# Patient Record
Sex: FEMALE | Race: WHITE
[De-identification: names, ages, dates, MRNs, and addresses within clinical notes are randomized per-mention and may not be internally consistent; named-entity substitution may affect disease eponyms.]

---

## 2017-01-07 NOTE — UC
Cardiac HPI





- HPI Summary


HPI Summary: 


C/o a burning, aching sensation in the upper chest for 6 days.  The pain is 

worse when she lays down. Here with Mom Violeta. She states that she gets this a 

lot. 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 over left chest 

wall. this is different bc it is not tender to palpation. usually it is 

stabbing. now it is burning aching. constant. not worse. not SOB. no 

tachycrardia or palpitations that she knows of. not sick, not coughing. GM had 

blood clot s/p heart surgery - uncertain genetic testing. denies specific 

recent swelling in legs although she has constant swelling and pain in her 

legs. 


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 

listed in med hx. states it is resolved - last checked a few months ago. 


Pain max 2/10, but 3-4/10 when she lies down. she just woke up with it on 

Monday. 


non-smoker. she is on OCPs. 


She is here with her Mom. 


denies pregnancy








- History of Current Complaint


Chief Complaint: UCChestPain


Stated Complaint: CHEST DISCOMFORT


Time Seen by Provider: 01/07/17 11:42





- Allergy/Home Medications


Allergies/Adverse Reactions: 


 Allergies











Allergy/AdvReac Type Severity Reaction Status Date / Time


 


Ciprofloxacin [From Cipro] Allergy Unknown Unknown Verified 01/07/17 11:41





   Reaction  





   Details  


 


Penicillins Allergy Unknown Unknown Verified 01/07/17 11:41





   Reaction  





   Details  


 


Clindamycin Allergy  Hives Verified 01/07/17 11:41


 


Sulfamethoxazole Allergy  Hives Verified 01/07/17 11:41





w/Trimethoprim     





[From Bactrim]     














PMH/Surg Hx/FS Hx/Imm Hx


Previously Healthy: Yes





- Surgical History


Surgical History: Yes


Surgery Procedure, Year, and Place: ovarian- pre-cancerous cells.  stent placed 

in kidneys





- Family History


Known Family History: Positive: Cardiac Disease, Hypertension, Blood Disorder - 

GM with blood clot post-op





- Social History


Alcohol Use: Rare


Substance Use Type: None


Smoking Status (MU): Former Smoker


Type: Cigarettes


Amount Used/How Often: 1/2 pack daily


When Did the Patient Quit Smoking/Using Tobacco: 2014





- Immunization History


Most Recent Influenza Vaccination: not this season





Review of Systems


Constitutional: Negative


Skin: Negative


Eyes: Negative


ENT: Negative


Respiratory: Negative


Cardiovascular: Chest Pain


Gastrointestinal: Negative


Genitourinary: Negative


Motor: Negative


Neurovascular: Negative


Musculoskeletal: Negative


Neurological: Negative


Psychological: Negative


All Other Systems Reviewed And Are Negative: Yes





Physical Exam


Triage Information Reviewed: Yes


Appearance: Well-Appearing, No Pain Distress, Well-Nourished - vague historian 

with brief answers


Vital Signs: 


 Initial Vital Signs











Temp  98.0 F   01/07/17 11:38


 


Pulse  81   01/07/17 11:38


 


Resp  16   01/07/17 11:38


 


BP  108/74   01/07/17 11:38


 


Pulse Ox  99   01/07/17 11:38











Eye Exam: Normal


ENT Exam: Normal


ENT: Positive: Normal ENT inspection, Pharynx normal, TMs normal.  Negative: 

Nasal congestion


Dental Exam: Normal


Neck exam: Normal


Neck: Positive: Supple, Nontender, No Lymphadenopathy


Respiratory: Positive: Chest non-tender - but she does admit to some "mild 

pressure" when I push over her left upper chest wall., Lungs clear, Normal 

breath sounds, No respiratory distress, No accessory muscle use.  Negative: 

Crackles, Rhonchi, Stridor, Wheezing


Cardiovascular Exam: Normal


Cardiovascular: Positive: RRR, No Murmur, Pulses Normal, Brisk Capillary Refill


Abdominal Exam: Normal


Abdomen Description: Positive: Nontender, Soft


Musculoskeletal Exam: Normal


Neurological Exam: Normal


Psychological Exam: Normal


Skin Exam: Normal





Diagnostics





- EKG


Cardiac Rate: NL - nml axis, no AV/IV changes, no ST/T changes, no S1, Q 3, 

inverted T3 pattern. no previous to compare to


Cardiac Rhythm: Sinus: Normal


Ectopy: None


ST Segment: Normal





Re-Evaluation





- Re-Evaluation


  ** First Eval


Re-Evaluation Time: 12:30


Change: Unchanged





- Assessment/Plan


Course Of Treatment: EKG - nml. I recommend ER eval for CT with contrast to r/o 

DVT (RFs include + fhx blood clot and on ocp). They decline ER/CT AMA. They are 

aware of risk of blood clot/PE and dangers/risks of death and permanent 

disability. They continue to refuse.  I have therefore ordered a CXR at this 

time that they agree to.  CXR is nefative for acute disease.  Mom has left by 

now. Bryson has not changed her mind about going to the ER. I have advised her 

to go to ER with any worsening sx, SOB, diaphoresis, etc.





- Differential Diagnoses - Chest Pain


Differential Diagnosis/HQI/PQRI: Chest Wall, Pulmonary Embolism





- Clinical Impression


Provider Diagnoses: chest pain





Discharge





- Discharge Plan


Condition: Good


Disposition: AGAINST MEDICAL ADVICE


Patient Education Materials:  Thoracic Pain (ED)


Referrals: 


Tegan Phillips MD [Primary Care Provider] - 2 Days


Additional Instructions: 


Make sure to follow up with your PCP in 2 days. Go to the ER with any worsening 

pain or other symptoms

## 2017-01-07 NOTE — RAD
HISTORY: Left chest pain



COMPARISONS: February 26, 2014



VIEWS: 2: Frontal dual-energy and lateral views of the chest.



FINDINGS:

CARDIOMEDIASTINAL SILHOUETTE: The cardiomediastinal silhouette is normal.

BROWN: The brown are normal.

PLEURA: The costophrenic angles are sharp. No pleural abnormalities are noted.

LUNG PARENCHYMA: The lungs are clear.

ABDOMEN: The upper abdomen is clear. There is no subphrenic gas.

BONES AND SOFT TISSUES: No bone or soft tissue abnormalities are noted.

OTHER: None.



IMPRESSION:

NO ACTIVE CARDIOPULMONARY DISEASE.

## 2018-12-26 ENCOUNTER — HOSPITAL ENCOUNTER (EMERGENCY)
Dept: HOSPITAL 25 - UCCORT | Age: 33
Discharge: HOME | End: 2018-12-26
Payer: COMMERCIAL

## 2018-12-26 VITALS — DIASTOLIC BLOOD PRESSURE: 78 MMHG | SYSTOLIC BLOOD PRESSURE: 120 MMHG
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
## 2018-12-26 DIAGNOSIS — Z88.1: ICD-10-CM

## 2018-12-26 DIAGNOSIS — Z87.891: ICD-10-CM
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
## 2018-12-26 DIAGNOSIS — S90.31XA: Primary | ICD-10-CM

## 2018-12-26 DIAGNOSIS — Z88.0: ICD-10-CM

## 2018-12-26 PROCEDURE — 99211 OFF/OP EST MAY X REQ PHY/QHP: CPT

## 2018-12-26 PROCEDURE — G0463 HOSPITAL OUTPT CLINIC VISIT: HCPCS

## 2018-12-26 NOTE — UC
Lower Extremity/Ankle HPI





- HPI Summary


HPI Summary: 


33-year-old female presents with complaints of right foot pain.  States 

approximately 6 weeks ago she accidentally dropped a piece pain Seiter on her 

foot.  States pain was improving however to 3 days ago her great jaquan stepped 

on her foot and  reaggravated the injury.  Reports some bruising and mild 

swelling to the dorsal aspect of her right foot.  She has been able to ambulate 

and bear weight without difficulty.  Has not taken any over-the-counter 

analgesics.  Denies any numbness or tingling.





- History of Current Complaint


Chief Complaint: UCLowerExtremity


Stated Complaint: RIGHT FOOT INJURY


Time Seen by Provider: 18 16:43


Hx Obtained From: Patient


Hx Last Menstrual Period: 18


Pain Intensity: 3





- Allergies/Home Medications


Allergies/Adverse Reactions: 


 Allergies











Allergy/AdvReac Type Severity Reaction Status Date / Time


 


ciprofloxacin [From Cipro] Allergy  Unknown Verified 18 16:36





   Reaction  





   Details  


 


clindamycin Allergy  Hives Verified 18 16:36


 


Penicillins Allergy  Unknown Verified 18 16:36





   Reaction  





   Details  


 


sulfamethoxazole Allergy  Hives Verified 18 16:36





[From Bactrim]     


 


trimethoprim [From Bactrim] Allergy  Hives Verified 18 16:36











Home Medications: 


 Home Medications





Oral Birth Control  1 tab PO DAILY 18 [History Confirmed 18]


Sertraline* [Zoloft*] 50 mg PO BEDTIME 18 [History Confirmed 18]











PMH/Surg Hx/FS Hx/Imm Hx


Previously Healthy: Yes - Denies significant PMH





- Surgical History


Surgical History: Yes


Surgery Procedure, Year, and Place: ovarian- pre-cancerous cells.  stent placed 

in kidneys-REMOVED





- Family History


Known Family History: Positive: Cardiac Disease, Hypertension, Blood Disorder - 

GM with blood clot post-op





- Social History


Occupation: Employed Full-time


Lives: With Family


Alcohol Use: None


Substance Use Type: None


Smoking Status (MU): Former Smoker


Type: Cigarettes


Amount Used/How Often: 1/2 pack daily


When Did the Patient Quit Smoking/Using Tobacco: 





- Immunization History


Most Recent Influenza Vaccination: not this season





Review of Systems


All Other Systems Reviewed And Are Negative: Yes


Constitutional: Negative: Fever, Chills


Skin: Positive: Bruising


Motor: Negative: Weakness


Neurovascular: Negative: Decreased Sensation


Musculoskeletal: Positive: Other: - See HPI.  Negative: Decreased ROM


Is Patient Immunocompromised?: No





Physical Exam





- Summary


Physical Exam Summary: 


GENERAL APPEARANCE: Well developed, well nourished, alert and cooperative, and 

appears to be in no acute distress.





CARDIAC: Normal S1 and S2. No S3, S4 or murmurs. Rhythm is regular. There is no 

peripheral edema, cyanosis or pallor. Extremities are warm and well perfused. 

Capillary refill is less than 2 seconds.





LUNGS: Clear to auscultation and percussion without rales, rhonchi, wheezing or 

diminished breath sounds.





ABDOMEN: Positive bowel sounds. Soft, nondistended, nontender. No guarding or 

rebound. No masses or hepatosplenomegally.





MUSKULOSKELETAL: Mild tenderness to dorsal aspect of medial right foot without 

crepitus or gross deformity. ROM intact to right ankle. No joint erythema or 

tenderness. Normal muscular development. Normal gait.





EXTREMITIES: No significant deformity or joint abnormality. Peripheral pulses 

intact.





NEUROLOGICAL: Strength and sensation symmetric and intact throughout.





SKIN: Mild ecchymosis to dorsal aspect of medial right foot otherwise normal 

color, texture and turgor with no lesions or eruptions.





Vital Signs: 


 Initial Vital Signs











Temp  97.7 F   18 16:34


 


Pulse  83   18 16:34


 


Resp  16   18 16:34


 


BP  120/78   18 16:34


 


Pulse Ox  100   18 16:34














Diagnostics





- Radiology


  ** No standard instances


Radiology Interpretation Completed By: Radiologist


Summary of Radiographic Findings: Patient Name: DANIEL POWELL Medical Record

#: [id number].  Ordering Physician: Jamar Stewart NP Acct.#: [id number].  

: 1985 Age: 33 Sex: F Location: [facility] - [facility].  Exam Date:  163 ADM Status: REG ER.  Order Information: FOOT RIGHT 3+ VWS.  

Accession Number: [id number].  CPT: 20125.  HISTORY: pain, right fifth 

metatarsal pain, subacute trauma.  COMPARISONS: None.  VIEWS: 3 , Frontal, 

lateral, and oblique views of the right foot.  FINDINGS:  BONE DENSITY: Normal.

  BONES: There is no displaced fracture.  JOINTS: There is no arthropathy.  

ALIGNMENT: There is no dislocation.  SOFT TISSUES: Unremarkable.  OTHER FINDINGS

: None.  IMPRESSION:  NO ACUTE OSSEOUS INJURY. IF SYMPTOMS PERSIST, RECOMMEND 

REPEAT IMAGING.





Lower Extremity Course/Dx





- Course


Course Of Treatment: 33-year-old female presents with complaints of right foot 

pain.  States approximately 6 weeks ago she accidentally dropped a piece pain 

Seiter on her foot.  States pain was improving however to 3 days ago her great 

jaquan stepped on her foot and  reaggravated the injury.  Reports some bruising 

and mild swelling to the dorsal aspect of her right foot.  She has been able to 

ambulate and bear weight without difficulty.  Has not taken any over-the-

counter analgesics.  Denies any numbness or tingling.  Afebrile.  Vital signs 

stable.  Exam unremarkable except for some mild bruising to the dorsal aspect 

of her medial right foot.  X-ray was negative for fracture or dislocation.  

Recommending conservative treatment of rest and over-the-counter analgesics.  

She is to follow-up with her primary care provider in 7 days if symptoms 

persist.





- Differential Dx/Diagnosis


Differential Diagnosis/HQI/PQRI: Contusion, Dislocation, Fracture (Closed), 

Sprain, Strain


Provider Diagnosis: 


 Contusion of right foot








Discharge





- Sign-Out/Discharge


Documenting (check all that apply): Patient Departure


All imaging exams completed and their final reports reviewed: Yes





- Discharge Plan


Condition: Stable


Disposition: HOME


Patient Education Materials:  Foot Contusion (ED)


Referrals: 


Tegan hPillips MD [Primary Care Provider] - 7 Days (If symptoms persist.)


Additional Instructions: 


Your x-ray of the foot performed in the clinic today was normal.





Rest the foot as much as possible. You may continue to ambulate and bear weight 

but should avoid strenuous activities.





Take acetaminophen (Tylenol) or ibuprofen (Advil, Motrin) according to 

directions as needed for pain.





Follow up with your primary care provider in 1 week if symptoms persist.





- Billing Disposition and Condition


Condition: STABLE


Disposition: Home





- Attestation Statements


Provider Attestation: 





I was available for consult. This patient was seen by the NIKITA. The patient was 

not presented to, seen by, or examined by me. -Moris